# Patient Record
Sex: FEMALE | Race: WHITE
[De-identification: names, ages, dates, MRNs, and addresses within clinical notes are randomized per-mention and may not be internally consistent; named-entity substitution may affect disease eponyms.]

---

## 2023-03-09 ENCOUNTER — HOSPITAL ENCOUNTER (OUTPATIENT)
Dept: HOSPITAL 75 - RAD | Age: 48
End: 2023-03-09
Attending: FAMILY MEDICINE
Payer: COMMERCIAL

## 2023-03-09 DIAGNOSIS — Z12.31: Primary | ICD-10-CM

## 2023-03-09 PROCEDURE — 77063 BREAST TOMOSYNTHESIS BI: CPT

## 2023-03-09 PROCEDURE — 76536 US EXAM OF HEAD AND NECK: CPT

## 2023-03-09 PROCEDURE — 77067 SCR MAMMO BI INCL CAD: CPT

## 2023-03-09 NOTE — DIAGNOSTIC IMAGING REPORT
INDICATION: Screening.



EXAMINATION: 3D bilateral screening mammogram with CAD. 



The current study was also evaluated with a Computer Aided

Detection (CAD) system.



COMPARISON: This study was compared to the prior exam of

05/04/2015. At this time there are no current complaints.



FINDINGS: The fibroglandular tissue in both breasts is

heterogeneously dense. This does limit the sensitivity of this

exam. When compared to the previous study there does not appear

to have been any significant change. There is no primary or

secondary sign of malignancy noted.



IMPRESSION: 

1. There is no evidence of malignancy.

2. Patient should have her annual bilateral screening mammogram

on schedule in March 2024.



ACR BI-RADS Category 1: Negative.

Result letter will be mailed to the patient.

Note:  At least 10% of breast cancer is not imaged by

mammography.



Dictated by: 



  Dictated on workstation # [id number]

## 2023-03-09 NOTE — DIAGNOSTIC IMAGING REPORT
PROCEDURE: US Thyroid.



TECHNIQUE: Multiple real-time grayscale images were obtained of

the thyroid in various projections.



INDICATION: Thyroid fullness, mass.



COMPARISON: None available.



FINDINGS:



Right thyroid lobe: The right thyroid lobe measures 4.9 x 1.1 x

1.7 cm. There are two benign colloid cysts within the left

thyroid lobe measuring up to 6 mm (TI-RADS 1).



Isthmus: The thyroid isthmus measures 0.2 cm and is without

nodule. 



Left thyroid lobe: The left thyroid lobe measures 4.7 x 1.4 x 2.0

cm. A mixed solid and cystic nodule in the upper pole of the left

thyroid has circumscribed margins, no echogenic foci and is wider

than tall measuring 1.7 x 1.2 x 1.5 cm (TI-RADS 2).



IMPRESSION:

1. No suspicious thyroid nodules that would require FNA or

dedicated surveillance imaging.

2. Thyroid is normal in size.



ACR TI-RADS: TR2. 



TI-RADS Recommendations:TR2 - Not Suspicious. No FNA.



Dictated by: 



  Dictated on workstation # [id number]